# Patient Record
Sex: FEMALE
[De-identification: names, ages, dates, MRNs, and addresses within clinical notes are randomized per-mention and may not be internally consistent; named-entity substitution may affect disease eponyms.]

---

## 2017-04-16 ENCOUNTER — HOSPITAL ENCOUNTER (EMERGENCY)
Dept: HOSPITAL 42 - ED | Age: 42
Discharge: HOME | End: 2017-04-16
Payer: COMMERCIAL

## 2017-04-16 VITALS — RESPIRATION RATE: 18 BRPM

## 2017-04-16 VITALS
HEART RATE: 75 BPM | SYSTOLIC BLOOD PRESSURE: 113 MMHG | TEMPERATURE: 98.8 F | OXYGEN SATURATION: 98 % | DIASTOLIC BLOOD PRESSURE: 74 MMHG

## 2017-04-16 VITALS — BODY MASS INDEX: 34.9 KG/M2

## 2017-04-16 DIAGNOSIS — L50.9: Primary | ICD-10-CM

## 2017-04-16 DIAGNOSIS — B86: ICD-10-CM

## 2017-04-16 NOTE — ED PDOC
Arrival/HPI





- General


Chief Complaint: Abnormal Skin Integrity


Time Seen by Provider: 04/16/17 16:53


Historian: Patient





- History of Present Illness


Narrative History of Present Illness (Text): 





04/16/17 17:12


42 y/o female, nkda, c/o whole body itching x 3 days with no change in soap/

clothing/detergent.  Itching rash, spreading to the neck/bilateral armpit/elbows

/toe/ankle/foot, no fever or chills, no headache or night sweat, no numbness or 

tingling, no palpitation, no other medical or psychological complaints. 





Past Medical History





- Provider Review


Nursing Documentation Reviewed: Yes





- Infectious Disease


Hx of Infectious Diseases: None





- Tetanus Immunization


Tetanus Immunization: Unknown





- Cardiac


Hx Cardiac Disorders: No





- Pulmonary


Hx Asthma: Yes


Other/Comment: DEPRESSION





- Neurological


Hx Migraine: Yes





- HEENT


Other/Comment: hx sinus infection in feb 2015, wears glasses





- Renal


Hx Renal Disorder: No





- Endocrine/Metabolic


Hx Endocrine Disorders: No





- Hematological/Oncological


Hx Anemia: Yes





- Integumentary


Hx Dermatological Disorder: No





- Gastrointestinal


Hx Gastrointestinal Disorders: Yes


Other/Comment: appy; bilateral foot surgery





- Genitourinary/Gynecological


Hx Genitourinary Disorders: No





- Psychiatric


Hx Psychophysiologic Disorder: No


Hx Anxiety: Yes


Hx Depression: Yes


Hx Substance Use: No





- Surgical History


Hx Appendectomy: Yes


Hx Orthopedic Surgery: Yes (bilat feet x9)





- Anesthesia


Hx Anesthesia: Yes


Hx Anesthesia Reactions: No


Hx Malignant Hyperthermia: No





- Suicidal Assessment


Feels Threatened In Home Enviroment: No





Family/Social History





- Physician Review


Nursing Documentation Reviewed: Yes


Family/Social History: Unknown Family HX


Smoking Status: Heavy Smoker > 10 Cigarettes Daily


Hx Alcohol Use: No


Hx Substance Use: No


Hx Substance Use Treatment: No





Allergies/Home Meds


Allergies/Adverse Reactions: 


Allergies





latex Adverse Reaction (Verified 04/16/17 17:05)


 ANGIOEDEMA








Home Medications: 


 Home Meds











 Medication  Instructions  Recorded  Confirmed


 


Buspirone 10 mg PO TID 11/08/13 04/16/17


 


Seroquel 500 mg PO HS 11/08/13 04/16/17


 


Alprazolam [Xanax] 0.5 mg PO PRN PRN 06/16/14 04/16/17


 


PARoxetine [Paxil] 40 mg PO DAILY 06/16/14 04/16/17


 


Zolpidem Tartrate [Ambien] 10 mg PO HS PRN 04/16/15 04/16/17


 


hydrOXYzine HCl [Atarax] 25 mg PO TID 04/16/17 04/16/17














Review of Systems





- Review of Systems


Constitutional: absent: Fatigue, Fevers


Eyes: absent: Vision Changes


ENT: absent: Hearing Changes


Respiratory: absent: Cough


Cardiovascular: absent: Chest Pain


Gastrointestinal: absent: Abdominal Pain, Nausea, Vomiting


Skin: Rash, Pruritis.  absent: Skin Lesions, Laceration, Abscess, Ulcer, 

Cellulitis


Neurological: absent: Headache, Dizziness, Focal Weakness, Gait Changes, Speech 

Changes, Facial Droop, Disequilibrium, Seizure





Physical Exam


Vital Signs Reviewed: Yes





Vital Signs











  Temp Pulse Resp BP Pulse Ox


 


 04/16/17 16:54  98.8 F  75  19  113/74  98











Temperature: Afebrile


Blood Pressure: Normal


Pulse: Regular


Respiratory Rate: Normal


Appearance: Positive for: Well-Appearing, Non-Toxic


Pain Distress: None


Mental Status: Positive for: Alert and Oriented X 3





- Systems Exam


Head: Present: Atraumatic, Normocephalic


Pupils: Present: PERRL


Extroacular Muscles: Present: EOMI


Conjunctiva: Present: Normal


Mouth: Present: Moist Mucous Membranes


Neck: Present: Normal Range of Motion


Respiratory/Chest: Present: Clear to Auscultation, Good Air Exchange.  No: 

Respiratory Distress, Accessory Muscle Use


Cardiovascular: Present: Regular Rate and Rhythm, Normal S1, S2.  No: Murmurs


Abdomen: Present: Normal Bowel Sounds.  No: Tenderness, Distention, Peritoneal 

Signs


Back: Present: Normal Inspection


Upper Extremity: Present: Normal Inspection.  No: Cyanosis, Edema


Lower Extremity: Present: Normal Inspection.  No: Edema


Neurological: Present: GCS=15, Speech Normal, Motor Func Grossly Intact, Gait 

Normal, Memory Normal


Skin: Present: Warm, Dry, Rashes (Posterior neck/bilateral armpits/groins/knee/

ankle/toes: visible scattered central insect bite marks with no cellulitis or 

streaking, no ulcers, no bullseye or target signs.  there is visible mild 

scattered hives on the rt. inner arm with each urticaria blanchable approx. 

[id number]. ), Normal Color


Psychiatric: Present: Alert, Oriented x 3, Normal Insight, Normal Concentration





Medical Decision Making


ED Course and Treatment: 





04/16/17 17:15


-I discussed with the patient that based on the rash pattern, this is 

concerning for scabies which I will give permethrin cream in the ER.


-Itching hives, I will give benadryl/pepcid/prednisone.


-Discharge home with benadryl, pepcid, prednisone, use the permethrin cream you 

given in the ER, stay hydrated, proper hygiene, follow up with your own pmd and 

dermatologist within 2 days, return to the ER for any new or worsening signs or 

symptoms. 





- PA / NP / Resident Statement


MD/ has reviewed & agrees with the documentation as recorded.





Disposition/Present on Arrival





- Present on Arrival


Any Indicators Present on Arrival: No


History of DVT/PE: No


History of Uncontrolled Diabetes: No


Urinary Catheter: No


History of Decub. Ulcer: No


History Surgical Site Infection Following: None





- Disposition


Have Diagnosis and Disposition been Completed?: Yes


Diagnosis: 


 Hives, Alteration in comfort associated with scabies


Disposition: HOME/ ROUTINE


Disposition Time: 17:18


Patient Plan: Discharge


Condition: GOOD


Additional Instructions: 


Discharge home with benadryl, pepcid, prednisone, use the permethrin cream you 

given in the ER, stay hydrated, proper hygiene, follow up with your own pmd and 

dermatologist within 2 days, return to the ER for any new or worsening signs or 

symptoms. 


Prescriptions: 


DiphenhydrAMINE [Benadryl] 50 mg PO QID PRN #25 cap


 PRN Reason: Other


Famotidine [Pepcid] 20 mg PO BID #12 tab


predniSONE [Prednisone] 2 tab PO DAILY #8 tab


Referrals: 


Keith Beasley MD [Staff Provider] - Follow up with primary


Forms:  WORK NOTE

## 2017-07-31 ENCOUNTER — HOSPITAL ENCOUNTER (OUTPATIENT)
Dept: HOSPITAL 31 - C.SDS | Age: 42
Discharge: HOME | End: 2017-07-31
Attending: SURGERY
Payer: COMMERCIAL

## 2017-07-31 VITALS
HEART RATE: 69 BPM | TEMPERATURE: 97.9 F | SYSTOLIC BLOOD PRESSURE: 114 MMHG | OXYGEN SATURATION: 96 % | DIASTOLIC BLOOD PRESSURE: 69 MMHG

## 2017-07-31 VITALS — RESPIRATION RATE: 18 BRPM

## 2017-07-31 VITALS — BODY MASS INDEX: 30.4 KG/M2

## 2017-07-31 DIAGNOSIS — K80.10: Primary | ICD-10-CM

## 2017-07-31 DIAGNOSIS — K82.8: ICD-10-CM

## 2017-07-31 PROCEDURE — 36415 COLL VENOUS BLD VENIPUNCTURE: CPT

## 2017-07-31 PROCEDURE — 84702 CHORIONIC GONADOTROPIN TEST: CPT

## 2017-07-31 PROCEDURE — 49329 UNLSTD LAPS PX ABD PERTM&OMN: CPT

## 2017-07-31 PROCEDURE — 47562 LAPAROSCOPIC CHOLECYSTECTOMY: CPT

## 2017-07-31 RX ADMIN — HYDROMORPHONE HYDROCHLORIDE PRN MG: 1 INJECTION, SOLUTION INTRAMUSCULAR; INTRAVENOUS; SUBCUTANEOUS at 15:51

## 2017-07-31 RX ADMIN — HYDROMORPHONE HYDROCHLORIDE PRN MG: 1 INJECTION, SOLUTION INTRAMUSCULAR; INTRAVENOUS; SUBCUTANEOUS at 15:37

## 2017-07-31 RX ADMIN — HYDROMORPHONE HYDROCHLORIDE PRN MG: 1 INJECTION, SOLUTION INTRAMUSCULAR; INTRAVENOUS; SUBCUTANEOUS at 16:09

## 2017-07-31 NOTE — PCM.SURG1
Surgeon's Initial Post Op Note





- Surgeon's Notes


Surgeon: Dr. Valladares


Assistant: DOROTA Stephens; Jenifer Fraga, PGY2; Willy Pillai, OMS3


Pre-Operative Diagnosis: Acute cholecystitis, cholelithiasis


Operative Findings: see full operative report


Post-Operative Diagnosis: same


Operation Performed: Laparoscopic cholecystectomy


Specimen/Specimens Removed: gallbladder


Estimated Blood Loss: EBL {In ML}: 15


Date of Surgery/Procedure: 07/31/17


Time of Surgery/Procedure: 14:00

## 2017-08-02 NOTE — OP
PROCEDURE DATE:  07/31/2017



PREOPERATIVE DIAGNOSES:

1.  Chronic cholecystitis.

2.  Cholelithiasis.



POSTOPERATIVE DIAGNOSES:

1.  Chronic cholecystitis.

2.  Cholelithiasis.

3.  Postinfectious adhesion.



PROCEDURES DONE:

1.  Robotic cholecystectomy.

2.  Robotic extensive lysis of adhesion.



SURGEON:  The procedure was done by me, Dr. Moiz Valladares.



ASSISTANT:  JESUS Workman and Jenifer Fraga D.O. PGY2 resident.

TYPE OF ANESTHESIA:  General endotracheal tube anesthesia.



ESTIMATED BLOOD LOSS:  Around 10 mL.



DRAINS:  None.



PATHOLOGY:  The gallbladder with gallstones were sent to the pathology.

COMPLICATIONS: None.



INTRAOPERATIVE FINDINGS:  The patient had a chronic cholecystitis with

extensive postinfectious adhesion in the right upper quadrant with

cholelithiasis.

DESCRIPTION OF THE PROCEDURE:  On intraoperative step, this is a

41-year-old female who was diagnosed with chronic cholecystitis and

cholelithiasis and the patient was consented for robotic cholecystectomy,

possible open.  The patient was brought to the OR, placed on the operating

room table.  After induction of the anesthesia, abdomen was prepped and

draped in an usual sterile fashion.  The supraumbilical transverse incision

was made.  Robotic camera port was placed.  Pneumo was created.  Another,

3.8-mm port was placed in upper abdomen and robot was brought in and camera

arm as well as arm 1 and arm 2 was docked, and through the console, the

dissection of the adhesion was done.  The gallbladder was retracted

cranially.  Calot triangle was identified.  The patient found to have

extensive adhesion of the omentum to the gallbladder, the duodenum to the

gallbladder and colon to the gallbladder, and after extensive lysis of

adhesion, the infundibulum was retracted laterally.  The intraoperative

firefly was used to confirm the anatomy, and cystic duct and cystic artery

and common bile duct was identified.  Now, the Calot triangle dissection

was done and cystic duct and cystic artery were clipped at three places and

cut in between two clips nearby gallbladder, and the gallbladder was

dissected-free from the gallbladder fossa, taken in the EndoCatch bag,

taken out through the umbilical port site and sent over the table for the

pathology.  The patient had extremely large stone as well as elongated

gallbladder and the gallbladder was taken out through umbilical port site and 
sent over

the table for pathology.  There was a proper hemostasis in each and every

part of the procedure.  The robot was undocked.  All the instruments were

taken out under vision and pneumo was deflated, and umbilical port site was

closed in two layers, the fascia with a 0 Vicryl interrupted sutures, skin

with a 4-0 Monocryl, and dry sterile dressing was applied.  The patient was

taken to the post-anesthesia care in stable condition.







__________________________________________

Moiz Valladares MD





DD:  08/01/2017 18:11:12

DT:  08/01/2017 23:59:00

Job # [id number]



MARY KAY

## 2018-09-27 ENCOUNTER — HOSPITAL ENCOUNTER (EMERGENCY)
Dept: HOSPITAL 42 - ED | Age: 43
Discharge: HOME | End: 2018-09-27
Payer: COMMERCIAL

## 2018-09-27 VITALS — SYSTOLIC BLOOD PRESSURE: 139 MMHG | TEMPERATURE: 98.8 F | DIASTOLIC BLOOD PRESSURE: 60 MMHG | OXYGEN SATURATION: 100 %

## 2018-09-27 VITALS — HEART RATE: 81 BPM

## 2018-09-27 VITALS — BODY MASS INDEX: 25 KG/M2

## 2018-09-27 VITALS — RESPIRATION RATE: 18 BRPM
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
## 2018-09-27 DIAGNOSIS — S83.401A: Primary | ICD-10-CM

## 2018-09-27 NOTE — RAD
Date of service: 



09/27/2018



PROCEDURE:  Radiographs of the right tibia and fibula.



HISTORY:

mechanical fall s/p slip and trip



COMPARISON:

None available



TECHNIQUE:

Frontal and lateral views obtained.



FINDINGS:



BONES:

No fracture or destructive lesion.



JOINT SPACES:

Unremarkable.



OTHER FINDINGS:

None.



IMPRESSION:

Unremarkable radiographs of the right tibia and fibula.

## 2018-09-27 NOTE — ED PDOC
Arrival/HPI





- General


Chief Complaint: Trauma


Time Seen by Provider: 18 17:08





- History of Present Illness


Narrative History of Present Illness (Text): 





44 y/o F h/o club foot presenting to the ED for right knee pain, s/p mechanical 

fall she sustained 7 days ago. The patient states she was ambulating down the 

stairs where she tripped and fell forwards, injuring her right knee. She denies 

LOC, neck pain, back pain or head trauma. She reports having difficulty 

extending and flexing the knee, but has been able to ambulate and place weight 

on it. The patient states she had been self-medicating with 3-6 pills of 

ibuprofen with no resolution of her symptoms. She denies chest pain, shortness 

of breath, palpitations, abdominal pain, back pain, weakness, syncopal episodes,

dizziness or parathesias. 











Time/Duration: 24 hours


Symptom Onset: Gradual


Symptom Course: Worsening


Quality: Aching


Severity Level: 5


Context: Sitting, Walking, Home





Past Medical History





- Provider Review


Nursing Documentation Reviewed: Yes





- Travel History


Have you recently traveled outside [place] w/in the past 3 mons?: No





- Infectious Disease


Hx of Infectious Diseases: None





- Tetanus Immunization


Tetanus Immunization: Unknown





- Cardiac


Hx Cardiac Disorders: No





- Neurological


Hx Migraine: Yes (WILL BRING MEDS/LAST WEEK LAST EPISODE)





- HEENT


Other/Comment: hx sinus infection in 2015





- Renal


Hx Renal Disorder: No





- Endocrine/Metabolic


Hx Endocrine Disorders: No





- Hematological/Oncological


Hx Blood Disorders: Yes


Hx Anemia: Yes





- Integumentary


Hx Dermatological Disorder: No





- Musculoskeletal/Rheumatological


Hx Musculoskeletal Disorders: Yes


Hx Back Pain: Yes (RT BUTTOCK)


Hx Falls: Yes (SLIPPED DOWNSTAIRS 17)





- Genitourinary/Gynecological


Hx Genitourinary Disorders: No





- Psychiatric


Hx Psychophysiologic Disorder: No


Hx Anxiety: Yes


Hx Bipolar Disorder: Yes


Hx Depression: Yes


Hx Substance Use: No


Other/Comment: OCD





- Surgical History


Hx Appendectomy: Yes


Hx  Section: Yes


Hx Cholecystectomy: Yes


Hx Orthopedic Surgery: Yes (bilat feet x9  CLUB FOOT)





- Anesthesia


Hx Anesthesia: Yes


Hx Anesthesia Reactions: No


Hx Malignant Hyperthermia: No





- Suicidal Assessment


Feels Threatened In Home Enviroment: No





Family/Social History





- Physician Review


Nursing Documentation Reviewed: Yes


Family/Social History: Unknown Family HX


Smoking Status: Heavy Smoker > 10 Cigarettes Daily


Hx Alcohol Use: No


Hx Substance Use: No


Hx Substance Use Treatment: No





Allergies/Home Meds


Allergies/Adverse Reactions: 


Allergies





latex Adverse Reaction (Severe, Verified 18 17:18)


   SWELLING








Home Medications: 


                                    Home Meds











 Medication  Instructions  Recorded  Confirmed


 


Zolpidem Tartrate [Ambien] 10 mg PO HS PRN 04/16/15 09/27/18


 


Albuterol HFA [Ventolin HFA 90 2 puff INH PRN PRN 17





mcg/actuation (8 g)]   


 


Alprazolam [Xanax] 1 tab PO DAILY 17


 


Paroxetine HCl [Paxil] 1 tab PO DAILY 17


 


QUEtiapine [Seroquel] 1 tab PO HS 17


 


busPIRone [Buspar] 1 tab PO TID 17














Review of Systems





- Physician Review


All systems were reviewed & negative as marked: Yes





- Review of Systems


Musculoskeletal: Arthralgias, Myalgias.  absent: Back Pain, Neck Pain, Joint 

Swelling


Skin: absent: Pruritis, Skin Lesions, Laceration





Physical Exam





Vital Signs











  Temp Pulse Resp BP Pulse Ox


 


 18 16:17  98.9 F  88  18  139/57 L  99











Temperature: Afebrile


Blood Pressure: Normal


Pulse: Regular


Respiratory Rate: Normal


Appearance: Positive for: Well-Appearing, Non-Toxic, Comfortable


Mental Status: Positive for: Alert and Oriented X 3





- Systems Exam


Head: Present: Atraumatic, Normocephalic


Pupils: Present: PERRL


Extroacular Muscles: Present: EOMI


Conjunctiva: Present: Normal


Mouth: Present: Moist Mucous Membranes


Neck: Present: Normal Range of Motion


Respiratory/Chest: Present: Clear to Auscultation, Good Air Exchange.  No: 

Respiratory Distress


Cardiovascular: Present: Regular Rate and Rhythm, Normal S1, S2


Abdomen: No: Tenderness, Normal Bowel Sounds, Peritoneal Signs


Back: Present: Normal Inspection.  No: CVA Tenderness, Midline Tenderness


Upper Extremity: Present: Normal Inspection.  No: Cyanosis, Edema


Lower Extremity: Present: NORMAL PULSES, Normal ROM, Tenderness (near devin-

lateral patellar region), Swelling (slight swelling at the pre-patellar region),

 Neurovascularly Intact, Capillary Refill < 2 s.  No: Edema, Cyanosis, Erythema


Neurological: Present: GCS=15, CN II-XII Intact, Speech Normal


Skin: Present: Warm, Dry, Normal Color.  No: Rashes





Medical Decision Making


ED Course and Treatment: 





Impression


44 y/o F w/  R knee pain s/p slip and trip 





Differential Includes But Is Not Limited To:


Patellar Fracture


Ligamentous Injury(ACL/MCL tear)


Dislocation








Plan


--XR R knee


--XR R tib-fib


--Motrin


--Reassess & disposition





Progress Notes


18 18:32


XR of knee shows no evidence of acute fracture or dislocation. XR tib-fib shows 

no evidence of fracture. Findings explained to patient who demonstrates 

understanding and will follow up with an orthopedic surgeon. Scripts provided. 

She is stable for discharge. 








- RAD Interpretation


Radiology Orders: 











18 17:33


KNEE W PATELLA RIGHT 3 VIEW [RAD] Stat 





18 17:34


TIBIA FIBULA RIGHT [RAD] Stat 














Disposition/Present on Arrival





- Present on Arrival


Any Indicators Present on Arrival: No


History of DVT/PE: No


History of Uncontrolled Diabetes: No


Urinary Catheter: No


History of Decub. Ulcer: No


History Surgical Site Infection Following: None





- Disposition


Have Diagnosis and Disposition been Completed?: No


Diagnosis: 


 Knee sprain





Disposition: HOME/ ROUTINE


Disposition Time: 18:35


Patient Plan: Discharge


Patient Problems: 


                             Current Active Problems











Problem Status Onset


 


Knee sprain Acute 











Condition: STABLE


Discharge Instructions (ExitCare):  Knee Sprain (DC)


Prescriptions: 


Ibuprofen [Motrin Tab] 600 mg PO Q6H PRN 6 Days #24 tab


 PRN Reason: Pain, Moderate (4-7)


Referrals: 


Omer Barraza DO [Staff Provider] - Follow up with primary


Forms:  [email or web address] (English)

## 2018-09-27 NOTE — RAD
Date of service: 



09/27/2018



PROCEDURE:  Right Knee Radiographs.



HISTORY:

mechanical fall w/ knee pain



COMPARISON:

None.



FINDINGS:



BONES:

Normal. No fracture. 



JOINTS:

Normal. No osteoarthritis. 



JOINT EFFUSION:

None. 



OTHER FINDINGS:

None.



IMPRESSION:

Normal radiographs of the right knee.